# Patient Record
Sex: MALE | Race: WHITE | Employment: OTHER | URBAN - METROPOLITAN AREA
[De-identification: names, ages, dates, MRNs, and addresses within clinical notes are randomized per-mention and may not be internally consistent; named-entity substitution may affect disease eponyms.]

---

## 2021-04-13 ENCOUNTER — APPOINTMENT (OUTPATIENT)
Dept: CT IMAGING | Age: 81
End: 2021-04-13
Attending: EMERGENCY MEDICINE
Payer: MEDICARE

## 2021-04-13 ENCOUNTER — HOSPITAL ENCOUNTER (EMERGENCY)
Age: 81
Discharge: HOME | End: 2021-04-14
Attending: EMERGENCY MEDICINE
Payer: MEDICARE

## 2021-04-13 ENCOUNTER — APPOINTMENT (OUTPATIENT)
Dept: GENERAL RADIOLOGY | Age: 81
End: 2021-04-13
Attending: EMERGENCY MEDICINE
Payer: MEDICARE
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
## 2021-04-13 DIAGNOSIS — S51.012A SKIN TEAR OF LEFT ELBOW WITHOUT COMPLICATION, INITIAL ENCOUNTER: ICD-10-CM

## 2021-04-13 PROCEDURE — 99285 EMERGENCY DEPT VISIT HI MDM: CPT

## 2021-04-13 PROCEDURE — 72125 CT NECK SPINE W/O DYE: CPT

## 2021-04-13 PROCEDURE — 70450 CT HEAD/BRAIN W/O DYE: CPT

## 2021-04-13 PROCEDURE — 73080 X-RAY EXAM OF ELBOW: CPT

## 2021-04-14 VITALS
TEMPERATURE: 98.7 F | HEART RATE: 97 BPM | DIASTOLIC BLOOD PRESSURE: 64 MMHG | SYSTOLIC BLOOD PRESSURE: 141 MMHG | OXYGEN SATURATION: 95 % | RESPIRATION RATE: 18 BRPM

## 2021-04-14 RX ORDER — FUROSEMIDE 10 MG/ML
40 INJECTION INTRAMUSCULAR; INTRAVENOUS
Status: DISCONTINUED | OUTPATIENT
Start: 2021-04-14 | End: 2021-04-14

## 2021-04-14 NOTE — ED PROVIDER NOTES
EMERGENCY DEPARTMENT HISTORY AND PHYSICAL EXAM      Date: 4/13/2021  Patient Name: Dottie Allen    Please note that this dictation was completed with [employer], the computer voice recognition software. Quite often unanticipated grammatical, syntax, homophones, and other interpretive errors are inadvertently transcribed by the computer software. Please disregard these errors. Please excuse any errors that have escaped final proofreading. History of Presenting Illness     Chief Complaint   Patient presents with    Fall     Patient arrives from [facility] after a ground level fall. He has a history of dementia and has a skin tear to the left elbow noted. History Provided By: Patient, EMS    HPI: Dottie Allen, 80 y.o. male, presenting the emergency department from [facility] with a fall. Patient has a history of dementia. He reportedly had a ground-level fall that was mechanical.  He is not able to obtain much history. He had told nursing home staff that he hit his head, he denies that to us. He complains of pain at the left elbow. There is a large skin tear at the left elbow. He is freely moving the upper extremities, freely ranging his neck. He denies any abdominal pain. No fevers chills or cough. HPI and review of systems otherwise limited secondary to clinical condition history of dementia. PCP: Chela Llanos MD    No current facility-administered medications on file prior to encounter. No current outpatient medications on file prior to encounter.        Past History     Past Medical History:  Stroke, subdural (traumatic), dementia  Past Surgical History:  Bur holes x2 for subdural hematoma    Family History:  Noncontributory  Social History:  Non-smoker    Allergies:  Not on File      Review of Systems   Review of Systems   Unable to perform ROS: Dementia (HPI limited secondary to dementia, patient denies abdominal pain or chest pain, he denies any hip or leg pain.)       Physical Exam   Physical Exam  Vitals signs and nursing note reviewed. Constitutional:       Appearance: He is well-developed. HENT:      Head: Normocephalic and atraumatic. Eyes:      General:         Right eye: No discharge. Left eye: No discharge. Conjunctiva/sclera: Conjunctivae normal.      Pupils: Pupils are equal, round, and reactive to light. Neck:      Musculoskeletal: Normal range of motion and neck supple. Trachea: No tracheal deviation. Cardiovascular:      Rate and Rhythm: Normal rate and regular rhythm. Heart sounds: Normal heart sounds. No murmur. Pulmonary:      Effort: Pulmonary effort is normal. No respiratory distress. Breath sounds: Normal breath sounds. No wheezing or rales. Abdominal:      General: Bowel sounds are normal.      Palpations: Abdomen is soft. Tenderness: There is no abdominal tenderness. There is no guarding or rebound. Musculoskeletal: Normal range of motion. General: No tenderness or deformity. Skin:     General: Skin is warm and dry. Findings: No erythema or rash. Comments: Skin tear to the left elbow   Neurological:      Mental Status: He is alert and oriented to person, place, and time. Psychiatric:         Behavior: Behavior normal.         Diagnostic Study Results     Labs -   No results found for this or any previous visit (from the past 12 hour(s)). Radiologic Studies -   XR ELBOW LT MIN 3 V   Final Result   No acute abnormality. CT HEAD WO CONT   Final Result   Right convexity tracy holes. Right convexity subdural low-attenuation   fluid and pneumocephalus, as described above. No subfalcine herniation. CT SPINE CERV WO CONT   Final Result   No acute fracture or subluxation. CT Results  (Last 48 hours)               04/13/21 8692  CT HEAD WO CONT Final result    Impression:  Right convexity tracy holes.  Right convexity subdural low-attenuation   fluid and pneumocephalus, as described above. No subfalcine herniation. Narrative:  INDICATION: Closed head injury. Ground level fall. Exam: Noncontrast CT of the brain is performed with 5 mm collimation. CT dose reduction was achieved with the use of the standardized protocol   tailored for this examination and automatic exposure control for dose   modulation. There is no prior study for direct comparison. FINDINGS: There are two right convexity [place] holes. There is right convexity   extra-axial subdural pneumocephalus and low attenuation fluid, measuring 2.1 cm   in greatest transverse dimension. There is right cerebral hemispheric sulcal   effacement, but no subfalcine herniation. There is no evidence of acute   territorial infarct. There is diffuse cortical atrophy and ex vacuo dilatation   of the ventricular system. No calvarial or skull base fracture is seen. The   mastoid air cells are well pneumatized. The visualized paranasal sinuses are   normal.           04/13/21 2242  CT SPINE CERV WO CONT Final result    Impression:  No acute fracture or subluxation. Narrative:  INDICATION: Fall, neck pain        Exam: Noncontrast CT of the cervical spine is performed with 2.5 mm collimation. Sagittal and coronal reformatted images were also performed. CT dose reduction was achieved through use of a standardized protocol tailored   for this examination and automatic exposure control for dose modulation. FINDINGS: There is no acute fracture or subluxation. The prevertebral soft   tissues are within normal limits. Bones are diffusely demineralized. Remaining   visualized soft tissues are normal. There are multilevel cervical degenerative   changes. No lytic or sclerotic osseous lesion is seen. CXR Results  (Last 48 hours)    None            Medical Decision Making   I am the first provider for this patient.     I reviewed the vital signs, available nursing notes, past medical history, past surgical history, family history and social history. Vital Signs-Reviewed the patient's vital signs. No data found. Records Reviewed:   Nursing notes, Prior visits           Provider Notes (Medical Decision Making):   I discussed the patient's care with the emergency physician at [facility].  Patient's head imaging is actually improved. On 4/1 his subdural on the right was measuring 2.5 cm and he had 6 mm of midline shift. His midline shift is minimal today. His subdural measures 2.1. He is mentating what appears to be baseline according know how family describes him. I do not think there is any concern for new or recurrent bleeding at this time. I believe patient can be safely discharged back to [facility]    ED Course:   Initial assessment performed. The patients presenting problems have been discussed, and they are in agreement with the care plan formulated and outlined with them. I have encouraged them to ask questions as they arise throughout their visit. Critical Care Time:   none    Disposition:    DISCHARGE NOTE  Patients results have been reviewed with them. Patient and/or family have verbally conveyed their understanding and agreement of the patient's signs, symptoms, diagnosis, treatment and prognosis and additionally agree to follow up as recommended or return to the Emergency Room should their condition change or have any new concerns prior to their follow-up appointment. Patient verbally agrees with the care-plan and verbally conveys that all of their questions have been answered. Discharge instructions have also been provided to the patient with some educational information regarding their diagnosis as well a list of reasons why they would want to return to the ER prior to their follow-up appointment should their condition change. PLAN:  1. There are no discharge medications for this patient.     2. Follow-up Information     Follow up With Specialties Details Why Contact Info    Bhaskar Vanegas MD Internal Medicine Schedule an appointment as soon as possible for a visit   [address]  [phone]      [facility] EMERGENCY DEPT Emergency Medicine  If symptoms worsen [address] [facility]  [phone]          Return to ED if worse     Diagnosis     Clinical Impression:   1. Fall, initial encounter    2. Skin tear of left elbow without complication, initial encounter        Attestations:   This note was completed by Leo Saul, DO

## 2021-04-14 NOTE — ED PROVIDER NOTES
EMERGENCY DEPARTMENT HISTORY AND PHYSICAL EXAM 
 
 
Date: 4/13/2021 Patient Name: Deisy Edouard Please note that this dictation was completed with [employer], the computer voice recognition software. Quite often unanticipated grammatical, syntax, homophones, and other interpretive errors are inadvertently transcribed by the computer software. Please disregard these errors. Please excuse any errors that have escaped final proofreading. History of Presenting Illness Chief Complaint Patient presents with  Fall Patient arrives from [facility] after a ground level fall. He has a history of dementia and has a skin tear to the left elbow noted. History Provided By: Patient, EMS 
 
HPI: Deisy Eoduard, 80 y.o. male, presenting the emergency department from [facility] with a fall. Patient has a history of dementia. He reportedly had a ground-level fall that was mechanical.  He is not able to obtain much history. He had told nursing home staff that he hit his head, he denies that to us. He complains of pain at the left elbow. There is a large skin tear at the left elbow. He is freely moving the upper extremities, freely ranging his neck. He denies any abdominal pain. No fevers chills or cough. HPI and review of systems otherwise limited secondary to clinical condition history of dementia. Further History obtained from family. His son Dolly Chauhan. He reports that patient had a fall in feb and had a brain bleed. He states that he had multiple rebleeds. Most recently he had a bleed with mass effect and signs of impending herniation and tracy hole about 2 weeks. PCP: Jonny Tyler MD 
 
No current facility-administered medications on file prior to encounter. No current outpatient medications on file prior to encounter. Past History Past Medical History: No past medical history on file. Past Surgical History: No past surgical history on file.  
 
Family History: No family history on file. Social History: 
Social History Tobacco Use  Smoking status: Not on file Substance Use Topics  Alcohol use: Not on file  Drug use: Not on file Allergies: 
Not on File Review of Systems Review of Systems Unable to perform ROS: Dementia (HPI limited secondary to dementia, patient denies abdominal pain or chest pain, he denies any hip or leg pain.) Physical Exam  
Physical Exam 
Vitals signs and nursing note reviewed. Constitutional:   
   Appearance: He is well-developed. HENT:  
   Head: Normocephalic and atraumatic. Eyes:  
   General:     
   Right eye: No discharge. Left eye: No discharge. Conjunctiva/sclera: Conjunctivae normal.  
   Pupils: Pupils are equal, round, and reactive to light. Neck: Musculoskeletal: Normal range of motion and neck supple. Trachea: No tracheal deviation. Cardiovascular:  
   Rate and Rhythm: Normal rate and regular rhythm. Heart sounds: Normal heart sounds. No murmur. Pulmonary:  
   Effort: Pulmonary effort is normal. No respiratory distress. Breath sounds: Normal breath sounds. No wheezing or rales. Abdominal:  
   General: Bowel sounds are normal.  
   Palpations: Abdomen is soft. Tenderness: There is no abdominal tenderness. There is no guarding or rebound. Musculoskeletal: Normal range of motion. General: No tenderness or deformity. Skin: 
   General: Skin is warm and dry. Findings: No erythema or rash. Comments: Skin tear to the left elbow Neurological:  
   Mental Status: He is alert and oriented to person, place, and time. Psychiatric:     
   Behavior: Behavior normal.  
 
 
 
Diagnostic Study Results Labs - No results found for this or any previous visit (from the past 12 hour(s)). Radiologic Studies - No orders to display CT Results  (Last 48 hours) None CXR Results  (Last 48 hours) None Medical Decision Making I am the first provider for this patient. I reviewed the vital signs, available nursing notes, past medical history, past surgical history, family history and social history. Vital Signs-Reviewed the patient's vital signs. Patient Vitals for the past 12 hrs: 
 Pulse Resp BP SpO2  
04/13/21 2135 (!) 109 18 (!) 142/99 100 % EKG interpretation: (Preliminary) *** Records Reviewed:  
Nursing notes, Prior visits *** Provider Notes (Medical Decision Making):  
*** 
 
ED Course:  
Initial assessment performed. The patients presenting problems have been discussed, and they are in agreement with the care plan formulated and outlined with them. I have encouraged them to ask questions as they arise throughout their visit. 11:31 PM 
Discussed with [facility] emergency physician who was able to review the most recent CT of the patient for me to help with continuing patient care. CT on 4/1 so a right-sided complex subdural with an air-fluid level measuring 25 mm with 6 mm of midline shift. His imaging today appears much improved. His subdural collection measures 21 mm and he has maybe 2 mm of midline shift per the radiologist.  Clinically he is at what family describes as his baseline with dementia. Critical Care Time:  
none Disposition: 
*** DISCHARGE NOTE Patients results have been reviewed with them. Patient and/or family have verbally conveyed their understanding and agreement of the patient's signs, symptoms, diagnosis, treatment and prognosis and additionally agree to follow up as recommended or return to the Emergency Room should their condition change or have any new concerns prior to their follow-up appointment. Patient verbally agrees with the care-plan and verbally conveys that all of their questions have been answered.    Discharge instructions have also been provided to the patient with some educational information regarding their diagnosis as well a list of reasons why they would want to return to the ER prior to their follow-up appointment should their condition change. PLAN: 
1. There are no discharge medications for this patient. 2.  
Follow-up Information None Return to ED if worse Diagnosis Clinical Impression: No diagnosis found. Attestations:  
This note was completed by Kelin Davila DO

## 2021-04-14 NOTE — ED NOTES
[facility] called and transport set up for patient to return to [facility].  ETA [id number]

## 2021-09-14 NOTE — PROGRESS NOTES
Neurology Note    Patient ID:  Kamille Saunders  [id number]  29 y.o.  1940      Date of Consultation:  September 15, 2021    Referring Physician: Dr. Naseem Montoya    Reason for Consultation:  Seizure, subdural          Assessment and Plan:    The patient is a pleasant 26-year-old gentleman with apparent history of dementia, subdural hematoma, stroke, peripheral neuropathy, epilepsy who presents to the neurology clinic for evaluation. The patient did present to clinic today with very little in the way of medical history and no family member or staffing that could provide additional information. History of subdural hematoma status post burrhole:  He should be kept off antiplatelet therapy. I will obtain a follow-up head CT. I was able to review her head CT from April 2021. History of epilepsy per chart:  Unclear as to the etiology and frequency of seizures. This may be related to prior subdural hematoma. Continue home dosing of Keppra 500 mg twice a day. I will obtain an EEG. History of stroke:  Unclear if this was an ischemic or hemorrhagic. Does have a history of hypertension  Continue off antiplatelets due to prior history of subdural hematoma. Hypertension: Aggressive control with goal systolic blood pressure under 140    Dementia: It appears from a note that in 2019 there was no cognitive impairment. It is unclear if this was related to his brain injury versus a progressive dementia. We will need to clarify with additional information. Peripheral neuropathy:  In 2019 it was noted this was related to vitamin B12 deficiency. I will work to obtain additional information. Reportedly, he does not walk at his facility and is always in a manual wheelchair. Subjective: I am not sure why I am here       History of Present Illness:   Kamille Saunders is a 80 y.o. male who was referred to the neurology clinic at [facility] for evaluation. The patient was seen in the emergency department at [facility] in April 2021. This was after a fall. It had been noted in those medical records that the patient had a subdural hematoma and was seen at [facility] a few days prior to being seen at [facility].  The subdural hematoma on the right seem to have been resolving some and no additional admission or testing was necessary. The patient [facility] The nursing assistant who was with him today did not know any medical history. There is no other additional medical records that was present. From reviewing the diagnoses on his medical sheet it stated that he did have epilepsy, stroke, dementia, subdural hematoma status post bur hole and other medical conditions. .    I was able to review a medical record from September 2019 in [facility] where he was seen by family practice at [facility] which at that time said he was oriented to person, place, and time. He was noted to have a peripheral neuropathy at that time and it was felt to be related to vitamin B12 deficiency. Today, the patient denies any complaints. He states he sleeps and eats well. He states that he used to live in [place] but I do not have any confirmation of that. Past medical history:  Dementia,  Subdural hematoma status post bur hole  Hypertension  Epilepsy  Cardiac disease,  Reflux disease  Prior diabetes,  Vitamin B12 deficiency    Past surgical history:  burrhole evacuation      Family history:  Cardiac disease    Social History     Tobacco Use    Smoking status:  Prior smoking   Substance Use Topics    Alcohol use:  Prior EtOH        Allergies   Allergen Reactions    Dexamethasone Other (comments)        Prior to Admission medications    Medication Sig Start Date End Date Taking? Authorizing Provider   ergocalciferol (Vitamin D2) 1,250 mcg (50,000 unit) capsule Take 50,000 Units by mouth.    Yes Provider, Historical   losartan (COZAAR) 50 mg tablet Take  by mouth daily. Yes Provider, Historical   ferrous sulfate (Iron) 325 mg (65 mg iron) tablet Take  by mouth Daily (before breakfast). Yes Provider, Historical   hydrALAZINE (APRESOLINE) 50 mg tablet Take 25 mg by mouth three (3) times daily. Yes Provider, Historical   levETIRAcetam (Keppra) 500 mg tablet Take  by mouth two (2) times a day. Yes Provider, Historical   omeprazole (PRILOSEC) 20 mg capsule Take 20 mg by mouth daily. Yes Provider, Historical   QUEtiapine (SEROqueL) 25 mg tablet Take 25 mg by mouth as needed. Yes Provider, Historical       Review of Systems:    [x]Unable to obtain  ROS due to  [x]mental status change  []sedated   []intubated    Objective:     Visit Vitals  /62 (BP 1 Location: Left upper arm, BP Patient Position: Sitting, BP Cuff Size: Adult)   Pulse 74   Resp 16   SpO2 97%       Physical Exam:    General:  appears well nourished in no acute distress  Neck: no carotid bruits  Lungs: clear to auscultation  Heart:  no murmurs, regular rate  Lower extremity: peripheral pulses palpable and no edema  Skin: intact    Neurological exam:    He was awake and alert. He is oriented to person. He did not know his location but did know he was in [place]. He did know the incorrect year. He had a difficult time with calculation. He could follow simple one-step commands. He had poor recall. He had difficulty with similarities and differences. Cranial nerves:   II-XII were tested    Perrrla  Visual fields were full  Eomi, no evidence of nystagmus  Facial sensation:  normal and symmetric  Facial motor: normal and symmetric  Hearing intact  SCM strength intact  Tongue: midline without fasciculations    Motor: Tone normal    No evidence of fasciculations    Strength testing: There was diffuse, generalized weakness. Due to his mental status, he had difficulty in comprehending formal muscle testing but appeared to be a 4 out of 5 throughout.       Sensory:  Upper extremity: intact to pp, light touch, and vibration > 10 seconds  Lower extremity: He had absent vibration at his toes and ankles. He did have vibration at his knees. Pinprick seem to be decreased to mid shin. Reflexes:    Right Left  Biceps  3 3  Triceps 2 2  Brachiorad. 2 2  Patella  3 3  Achilles - -    Plantar response: Sensor bilaterally      Cerebellar testing:  no tremor apparent, finger/nose and sidra were intact    Gait: This was not assessed due to concerns over safety. Labs:     No results found for: HBA1C, NA, K, CL, GLU, BUN, CREA, CA, WBC, HCT, HGB, PLT, LDL, JLD7QGLB, HCTEXT, HGBEXT, PLTEXT, [id number], HCTEXT, HGBEXT, PLTEXT    Imaging:    No results found for this or any previous visit. Results from [place] encounter on 04/13/21    CT SPINE CERV WO CONT    Narrative  INDICATION: Fall, neck pain    Exam: Noncontrast CT of the cervical spine is performed with 2.5 mm collimation. Sagittal and coronal reformatted images were also performed. CT dose reduction was achieved through use of a standardized protocol tailored  for this examination and automatic exposure control for dose modulation. FINDINGS: There is no acute fracture or subluxation. The prevertebral soft  tissues are within normal limits. Bones are diffusely demineralized. Remaining  visualized soft tissues are normal. There are multilevel cervical degenerative  changes. No lytic or sclerotic osseous lesion is seen. Impression  No acute fracture or subluxation. I did independently review his head CT from April 13, 2021. There was a right subdural hematoma and pneumocephalus. There is no herniation. He does have diffuse cortical atrophy and ventricular dilatation. There is no problem list on file for this patient.      The patient should return to clinic in 3-4 months    Renewed medication: none    I spent 65    minutes on the day of the encounter preparing the office visit by reviewing medical records, obtaining a history, performing examination, counseling and educating the patient  on diagnosis, ordering tests, documenting in the clinical medical record, and coordinating the care for the patient. The patient had the ability to ask questions and all questions were answered.                  Signed By:  Brigette Frances DO FAAN    September 15, 2021

## 2021-09-15 ENCOUNTER — OFFICE VISIT (OUTPATIENT)
Dept: NEUROLOGY | Age: 81
End: 2021-09-15
Payer: MEDICARE

## 2021-09-15 VITALS
SYSTOLIC BLOOD PRESSURE: 104 MMHG | OXYGEN SATURATION: 97 % | HEART RATE: 74 BPM | DIASTOLIC BLOOD PRESSURE: 62 MMHG | RESPIRATION RATE: 16 BRPM

## 2021-09-15 DIAGNOSIS — R56.9 SEIZURE (HCC): ICD-10-CM

## 2021-09-15 DIAGNOSIS — I63.9 CEREBROVASCULAR ACCIDENT (CVA), UNSPECIFIED MECHANISM (HCC): ICD-10-CM

## 2021-09-15 DIAGNOSIS — F03.90 DEMENTIA WITHOUT BEHAVIORAL DISTURBANCE, UNSPECIFIED DEMENTIA TYPE: ICD-10-CM

## 2021-09-15 DIAGNOSIS — S06.5XAA SUBDURAL HEMATOMA: Primary | ICD-10-CM

## 2021-09-15 DIAGNOSIS — G62.9 NEUROPATHY: ICD-10-CM

## 2021-09-15 PROCEDURE — G8432 DEP SCR NOT DOC, RNG: HCPCS | Performed by: PSYCHIATRY & NEUROLOGY

## 2021-09-15 PROCEDURE — 99205 OFFICE O/P NEW HI 60 MIN: CPT | Performed by: PSYCHIATRY & NEUROLOGY

## 2021-09-15 PROCEDURE — G8421 BMI NOT CALCULATED: HCPCS | Performed by: PSYCHIATRY & NEUROLOGY

## 2021-09-15 PROCEDURE — 1101F PT FALLS ASSESS-DOCD LE1/YR: CPT | Performed by: PSYCHIATRY & NEUROLOGY

## 2021-09-15 PROCEDURE — G8427 DOCREV CUR MEDS BY ELIG CLIN: HCPCS | Performed by: PSYCHIATRY & NEUROLOGY

## 2021-09-15 PROCEDURE — G8536 NO DOC ELDER MAL SCRN: HCPCS | Performed by: PSYCHIATRY & NEUROLOGY

## 2021-09-15 RX ORDER — LEVETIRACETAM 500 MG/1
TABLET ORAL 2 TIMES DAILY
COMMUNITY

## 2021-09-15 RX ORDER — QUETIAPINE FUMARATE 25 MG/1
25 TABLET, FILM COATED ORAL AS NEEDED
COMMUNITY

## 2021-09-15 RX ORDER — HYDRALAZINE HYDROCHLORIDE 50 MG/1
25 TABLET, FILM COATED ORAL 3 TIMES DAILY
COMMUNITY

## 2021-09-15 RX ORDER — ERGOCALCIFEROL 1.25 MG/1
50000 CAPSULE ORAL
COMMUNITY

## 2021-09-15 RX ORDER — OMEPRAZOLE 20 MG/1
20 CAPSULE, DELAYED RELEASE ORAL DAILY
COMMUNITY

## 2021-09-15 RX ORDER — LOSARTAN POTASSIUM 50 MG/1
TABLET ORAL DAILY
COMMUNITY

## 2021-09-15 RX ORDER — LANOLIN ALCOHOL/MO/W.PET/CERES
CREAM (GRAM) TOPICAL
COMMUNITY

## 2021-10-29 ENCOUNTER — HOSPITAL ENCOUNTER (OUTPATIENT)
Dept: NEUROLOGY | Age: 81
Discharge: HOME | End: 2021-10-29
Attending: PSYCHIATRY & NEUROLOGY
Payer: MEDICARE

## 2021-10-29 ENCOUNTER — HOSPITAL ENCOUNTER (OUTPATIENT)
Dept: CT IMAGING | Age: 81
Discharge: HOME | End: 2021-10-29
Attending: PSYCHIATRY & NEUROLOGY
Payer: MEDICARE

## 2021-10-29 DIAGNOSIS — R56.9 SEIZURE (HCC): ICD-10-CM

## 2021-10-29 DIAGNOSIS — S06.5XAA SUBDURAL HEMATOMA: ICD-10-CM

## 2021-10-29 PROCEDURE — 95816 EEG AWAKE AND DROWSY: CPT | Performed by: PSYCHIATRY & NEUROLOGY

## 2021-10-29 PROCEDURE — 70450 CT HEAD/BRAIN W/O DYE: CPT

## 2021-10-29 PROCEDURE — 95816 EEG AWAKE AND DROWSY: CPT

## 2021-10-29 NOTE — PROCEDURES
EEG REPORT    Patient Name: Anna Márquez  : 1940  Age: 80 y.o. Ordering physician: Ann-Marie Santos  Date of EEG: 10/29/2021  8:18-8:38  Diagnosis: seizure  Interpreting physician: Arben Ko D.O. FAAN    Procedure: EEG    CLINICAL INDICATION: The patient is a 80 y.o. male who is being evaluated for baseline electro cerebral activities and to rule out seizure focus. Current Outpatient Medications   Medication Sig    ergocalciferol (Vitamin D2) 1,250 mcg (50,000 unit) capsule Take 50,000 Units by mouth.  losartan (COZAAR) 50 mg tablet Take  by mouth daily.  ferrous sulfate (Iron) 325 mg (65 mg iron) tablet Take  by mouth Daily (before breakfast).  hydrALAZINE (APRESOLINE) 50 mg tablet Take 25 mg by mouth three (3) times daily.  levETIRAcetam (Keppra) 500 mg tablet Take  by mouth two (2) times a day.  omeprazole (PRILOSEC) 20 mg capsule Take 20 mg by mouth daily.  QUEtiapine (SEROqueL) 25 mg tablet Take 25 mg by mouth as needed. No current facility-administered medications for this encounter. DESCRIPTION OF PROCEDURE:     This is a digitally recorded electroencephalogram  Electrodes were applied in accordance with the international 10-20 system of electrode placement. 18 channels of scalp EEG are recorded  A channel was used for EoG  Another channel was used for ECG   The data is stored digitally and reviewed in reformatted montages for optimal display  EEG  was reviewed in both bipolar and referential montages    Description of Activity: The background of this recording contains a posteriorly-located occipital alpha rhythm of 9-10 hz that attenuates with eye opening. This was seen maximally over the posterior head region and was symmetric. There was a small amount of beta activity seen. Throughout the recording, there were no clear areas of focal slowing nor spike or spike-and-wave discharges seen.  The amplitude was slightly higher on the right throughout the study with no abnormalities. Hyperventilation was not performed. Photic stimulation produced no response in the posterior head regions. During drowsiness, there is attenuation of the underlying  frequency and slower theta activity occurs. During the recording, the patient did not achieve stage II sleep        Clinical Interpretation: This EEG, performed during wakefulness and drowsiness, is normal.  There was no clear focal abnormalities or epileptiform activity. A normal EEG doesn't not rule out seizures. Clinical correlation recommended. FRANCESCA Perez